# Patient Record
Sex: MALE | HISPANIC OR LATINO | Employment: FULL TIME | URBAN - METROPOLITAN AREA
[De-identification: names, ages, dates, MRNs, and addresses within clinical notes are randomized per-mention and may not be internally consistent; named-entity substitution may affect disease eponyms.]

---

## 2017-02-28 ENCOUNTER — HOSPITAL ENCOUNTER (OUTPATIENT)
Dept: LAB | Facility: MEDICAL CENTER | Age: 27
End: 2017-02-28
Attending: FAMILY MEDICINE
Payer: COMMERCIAL

## 2017-02-28 LAB
ALBUMIN SERPL BCP-MCNC: 4.5 G/DL (ref 3.2–4.9)
ALBUMIN/GLOB SERPL: 1.6 G/DL
ALP SERPL-CCNC: 70 U/L (ref 30–99)
ALT SERPL-CCNC: 32 U/L (ref 2–50)
ANION GAP SERPL CALC-SCNC: 9 MMOL/L (ref 0–11.9)
AST SERPL-CCNC: 23 U/L (ref 12–45)
BASOPHILS # BLD AUTO: 0.04 K/UL (ref 0–0.12)
BASOPHILS NFR BLD AUTO: 0.7 % (ref 0–1.8)
BILIRUB SERPL-MCNC: 0.6 MG/DL (ref 0.1–1.5)
BUN SERPL-MCNC: 17 MG/DL (ref 8–22)
CALCIUM SERPL-MCNC: 9.6 MG/DL (ref 8.5–10.5)
CHLORIDE SERPL-SCNC: 105 MMOL/L (ref 96–112)
CHOLEST SERPL-MCNC: 156 MG/DL (ref 100–199)
CO2 SERPL-SCNC: 27 MMOL/L (ref 20–33)
CREAT SERPL-MCNC: 1 MG/DL (ref 0.5–1.4)
EOSINOPHIL # BLD: 0.17 K/UL (ref 0–0.51)
EOSINOPHIL NFR BLD AUTO: 3 % (ref 0–6.9)
ERYTHROCYTE [DISTWIDTH] IN BLOOD BY AUTOMATED COUNT: 40.7 FL (ref 35.9–50)
GLOBULIN SER CALC-MCNC: 2.8 G/DL (ref 1.9–3.5)
GLUCOSE SERPL-MCNC: 89 MG/DL (ref 65–99)
HCT VFR BLD AUTO: 49.7 % (ref 42–52)
HCV AB S/CO SERPL IA: NEGATIVE
HDLC SERPL-MCNC: 50 MG/DL
HGB BLD-MCNC: 16.9 G/DL (ref 14–18)
IMM GRANULOCYTES # BLD AUTO: 0.01 K/UL (ref 0–0.11)
IMM GRANULOCYTES NFR BLD AUTO: 0.2 % (ref 0–0.9)
LDLC SERPL CALC-MCNC: 95 MG/DL
LYMPHOCYTES # BLD: 1.88 K/UL (ref 1–4.8)
LYMPHOCYTES NFR BLD AUTO: 33.6 % (ref 22–41)
MCH RBC QN AUTO: 30.8 PG (ref 27–33)
MCHC RBC AUTO-ENTMCNC: 34 G/DL (ref 33.7–35.3)
MCV RBC AUTO: 90.5 FL (ref 81.4–97.8)
MONOCYTES # BLD: 0.41 K/UL (ref 0–0.85)
MONOCYTES NFR BLD AUTO: 7.3 % (ref 0–13.4)
NEUTROPHILS # BLD: 3.08 K/UL (ref 1.82–7.42)
NEUTROPHILS NFR BLD AUTO: 55.2 % (ref 44–72)
NRBC # BLD AUTO: 0 K/UL
NRBC BLD-RTO: 0 /100 WBC
PLATELET # BLD AUTO: 208 K/UL (ref 164–446)
PMV BLD AUTO: 9.9 FL (ref 9–12.9)
POTASSIUM SERPL-SCNC: 4 MMOL/L (ref 3.6–5.5)
PROT SERPL-MCNC: 7.3 G/DL (ref 6–8.2)
RBC # BLD AUTO: 5.49 M/UL (ref 4.7–6.1)
SODIUM SERPL-SCNC: 141 MMOL/L (ref 135–145)
TRIGL SERPL-MCNC: 55 MG/DL (ref 0–149)
WBC # BLD AUTO: 5.6 K/UL (ref 4.8–10.8)

## 2017-02-28 PROCEDURE — 85025 COMPLETE CBC W/AUTO DIFF WBC: CPT

## 2017-02-28 PROCEDURE — 36415 COLL VENOUS BLD VENIPUNCTURE: CPT

## 2017-02-28 PROCEDURE — 86803 HEPATITIS C AB TEST: CPT

## 2017-02-28 PROCEDURE — 80061 LIPID PANEL: CPT

## 2017-02-28 PROCEDURE — 80053 COMPREHEN METABOLIC PANEL: CPT

## 2017-11-23 ENCOUNTER — OFFICE VISIT (OUTPATIENT)
Dept: URGENT CARE | Facility: PHYSICIAN GROUP | Age: 27
End: 2017-11-23
Payer: COMMERCIAL

## 2017-11-23 VITALS
HEIGHT: 69 IN | BODY MASS INDEX: 30.96 KG/M2 | HEART RATE: 86 BPM | RESPIRATION RATE: 16 BRPM | DIASTOLIC BLOOD PRESSURE: 78 MMHG | OXYGEN SATURATION: 96 % | TEMPERATURE: 98.7 F | WEIGHT: 209 LBS | SYSTOLIC BLOOD PRESSURE: 124 MMHG

## 2017-11-23 DIAGNOSIS — R05.9 COUGH: ICD-10-CM

## 2017-11-23 DIAGNOSIS — R09.81 SINUS CONGESTION: ICD-10-CM

## 2017-11-23 PROCEDURE — 99214 OFFICE O/P EST MOD 30 MIN: CPT | Performed by: PHYSICIAN ASSISTANT

## 2017-11-23 RX ORDER — AMOXICILLIN AND CLAVULANATE POTASSIUM 875; 125 MG/1; MG/1
1 TABLET, FILM COATED ORAL 2 TIMES DAILY
Qty: 20 TAB | Refills: 0 | Status: SHIPPED | OUTPATIENT
Start: 2017-11-23 | End: 2017-12-03

## 2017-11-23 RX ORDER — FLUTICASONE PROPIONATE 50 MCG
2 SPRAY, SUSPENSION (ML) NASAL DAILY
Qty: 16 G | Refills: 0 | Status: SHIPPED | OUTPATIENT
Start: 2017-11-23

## 2017-11-23 RX ORDER — CETIRIZINE HYDROCHLORIDE 10 MG/1
10 TABLET ORAL DAILY
Qty: 30 TAB | Refills: 0 | Status: SHIPPED | OUTPATIENT
Start: 2017-11-23

## 2017-11-23 ASSESSMENT — ENCOUNTER SYMPTOMS
VOMITING: 0
SORE THROAT: 1
DIARRHEA: 0
CHILLS: 0
HEADACHES: 1
FEVER: 1
MYALGIAS: 0
COUGH: 1
EYE REDNESS: 0
WHEEZING: 0
ABDOMINAL PAIN: 0
SHORTNESS OF BREATH: 0
EYE DISCHARGE: 0
NAUSEA: 0

## 2017-11-23 NOTE — PROGRESS NOTES
Subjective:      Saúl Ramsay is a 27 y.o. male who presents with Fever (of 101 last night, runny nose, pain & pressure behind eyes, cough x 1 month)            Fever    Associated symptoms include congestion, coughing, headaches and a sore throat. Pertinent negatives include no abdominal pain, chest pain, diarrhea, ear pain, nausea, rash, vomiting or wheezing.   notes last night w/ fever, took tylenol 12hr last night at 10pm, cough notes some dry cough, cough x last month, denies ear pain, c/o eye pressure and mild sorethroat, denies nausea/vomtint/abedpain/diARrhea/rash, denies pMH ofasthma/bronchitis, denies pMH of pneumonia, denies seasonal allerg, tried water for cough, tried tylenol cold / flu. Denies cough waking from sleep, some sinus congestion, some sinus congestion and runny nose as well. Denies flu shot.,     Review of Systems   Constitutional: Positive for fever. Negative for chills.   HENT: Positive for congestion and sore throat. Negative for ear pain.    Eyes: Negative for discharge and redness.   Respiratory: Positive for cough. Negative for shortness of breath and wheezing.    Cardiovascular: Negative for chest pain and leg swelling.   Gastrointestinal: Negative for abdominal pain, diarrhea, nausea and vomiting.   Musculoskeletal: Negative for myalgias.   Skin: Negative for rash.   Neurological: Positive for headaches.   Endo/Heme/Allergies: Negative for environmental allergies ( ?).     PMH:  has no past medical history on file.  MEDS:   Current Outpatient Prescriptions:   •  Ibuprofen (IBU PO), Take  by mouth., Disp: , Rfl:   •  ondansetron (ZOFRAN) 4 MG Tab tablet, Take 1 Tab by mouth every four hours as needed for Nausea/Vomiting., Disp: 20 Tab, Rfl: 1  •  fluticasone (FLONASE) 50 MCG/ACT nasal spray, Spray 1 Spray in nose every day., Disp: 1 Bottle, Rfl: 0  •  cetirizine (ZYRTEC) 10 MG TABS, Take 1 Tab by mouth every day., Disp: 30 Each, Rfl: 0  ALLERGIES: No Known Allergies  SURGHX: History  "reviewed. No pertinent surgical history.  SOCHX:  reports that he has never smoked. He has never used smokeless tobacco. He reports that he drinks alcohol.  FH: Family history was reviewed, no pertinent findings to report    .I have worn a mask for the entire encounter with this patient.        Objective:     /78   Pulse 86   Temp 37.1 °C (98.7 °F)   Resp 16   Ht 1.753 m (5' 9.02\")   Wt 94.8 kg (209 lb)   SpO2 96%   BMI 30.85 kg/m²      Physical Exam   Constitutional: He is oriented to person, place, and time. He appears well-developed and well-nourished. No distress.   HENT:   Head: Normocephalic and atraumatic.   Right Ear: External ear and ear canal normal. Tympanic membrane is bulging. Tympanic membrane is not erythematous.   Left Ear: External ear and ear canal normal. Tympanic membrane is bulging. Tympanic membrane is not erythematous.   Nose: Nose normal. Right sinus exhibits no maxillary sinus tenderness and no frontal sinus tenderness. Left sinus exhibits no maxillary sinus tenderness and no frontal sinus tenderness.   Mouth/Throat: Uvula is midline and mucous membranes are normal. Posterior oropharyngeal erythema ( PND) present. No oropharyngeal exudate, posterior oropharyngeal edema or tonsillar abscesses.   Eyes: Conjunctivae are normal. Right eye exhibits no discharge. Left eye exhibits no discharge. No scleral icterus.   Neck: Neck supple.   Pulmonary/Chest: Effort normal and breath sounds normal. No respiratory distress. He has no decreased breath sounds. He has no wheezes. He has no rhonchi. He has no rales.   Musculoskeletal: Normal range of motion.   Lymphadenopathy:     He has cervical adenopathy ( mild bilat).   Neurological: He is alert and oriented to person, place, and time. Coordination normal.   Skin: Skin is warm and dry. He is not diaphoretic. No pallor.   Psychiatric: He has a normal mood and affect.   Nursing note and vitals reviewed.              Assessment/Plan:     1. " Cough  Supportive care is reviewed with patient/caregiver - recommend to push PO fluids and electrolytes, Nsaids/tylenol, netti pot/saline irrig, humidifier in home, flonase, ponaris, antihistamines,  take full course of Rx, take with probiotics, observe for resolution  Return to clinic with lack of resolution or progression of symptoms.    2. Sinus congestion    - amoxicillin-clavulanate (AUGMENTIN) 875-125 MG Tab; Take 1 Tab by mouth 2 times a day for 10 days.  Dispense: 20 Tab; Refill: 0  - fluticasone (FLONASE) 50 MCG/ACT nasal spray; Spray 2 Sprays in nose every day.  Dispense: 16 g; Refill: 0  - cetirizine (ZYRTEC) 10 MG Tab; Take 1 Tab by mouth every day.  Dispense: 30 Tab; Refill: 0

## 2020-08-07 ENCOUNTER — HOSPITAL ENCOUNTER (OUTPATIENT)
Dept: LAB | Facility: MEDICAL CENTER | Age: 30
End: 2020-08-07
Attending: FAMILY MEDICINE
Payer: COMMERCIAL

## 2020-08-07 LAB
ALBUMIN SERPL BCP-MCNC: 4.9 G/DL (ref 3.2–4.9)
ALBUMIN/GLOB SERPL: 2.5 G/DL
ALP SERPL-CCNC: 86 U/L (ref 30–99)
ALT SERPL-CCNC: 49 U/L (ref 2–50)
ANION GAP SERPL CALC-SCNC: 12 MMOL/L (ref 7–16)
AST SERPL-CCNC: 25 U/L (ref 12–45)
BASOPHILS # BLD AUTO: 0.7 % (ref 0–1.8)
BASOPHILS # BLD: 0.05 K/UL (ref 0–0.12)
BILIRUB SERPL-MCNC: 0.4 MG/DL (ref 0.1–1.5)
BUN SERPL-MCNC: 16 MG/DL (ref 8–22)
CALCIUM SERPL-MCNC: 9.2 MG/DL (ref 8.5–10.5)
CHLORIDE SERPL-SCNC: 103 MMOL/L (ref 96–112)
CHOLEST SERPL-MCNC: 181 MG/DL (ref 100–199)
CO2 SERPL-SCNC: 23 MMOL/L (ref 20–33)
CREAT SERPL-MCNC: 0.89 MG/DL (ref 0.5–1.4)
EOSINOPHIL # BLD AUTO: 0.19 K/UL (ref 0–0.51)
EOSINOPHIL NFR BLD: 2.5 % (ref 0–6.9)
ERYTHROCYTE [DISTWIDTH] IN BLOOD BY AUTOMATED COUNT: 41.4 FL (ref 35.9–50)
FASTING STATUS PATIENT QL REPORTED: NORMAL
GLOBULIN SER CALC-MCNC: 2 G/DL (ref 1.9–3.5)
GLUCOSE SERPL-MCNC: 112 MG/DL (ref 65–99)
HCT VFR BLD AUTO: 48.5 % (ref 42–52)
HDLC SERPL-MCNC: 41 MG/DL
HGB BLD-MCNC: 16.6 G/DL (ref 14–18)
IMM GRANULOCYTES # BLD AUTO: 0.03 K/UL (ref 0–0.11)
IMM GRANULOCYTES NFR BLD AUTO: 0.4 % (ref 0–0.9)
LDLC SERPL CALC-MCNC: 108 MG/DL
LYMPHOCYTES # BLD AUTO: 2.8 K/UL (ref 1–4.8)
LYMPHOCYTES NFR BLD: 37 % (ref 22–41)
MCH RBC QN AUTO: 31.1 PG (ref 27–33)
MCHC RBC AUTO-ENTMCNC: 34.2 G/DL (ref 33.7–35.3)
MCV RBC AUTO: 90.8 FL (ref 81.4–97.8)
MONOCYTES # BLD AUTO: 0.55 K/UL (ref 0–0.85)
MONOCYTES NFR BLD AUTO: 7.3 % (ref 0–13.4)
NEUTROPHILS # BLD AUTO: 3.95 K/UL (ref 1.82–7.42)
NEUTROPHILS NFR BLD: 52.1 % (ref 44–72)
NRBC # BLD AUTO: 0 K/UL
NRBC BLD-RTO: 0 /100 WBC
PLATELET # BLD AUTO: 187 K/UL (ref 164–446)
PMV BLD AUTO: 9.8 FL (ref 9–12.9)
POTASSIUM SERPL-SCNC: 3.8 MMOL/L (ref 3.6–5.5)
PROT SERPL-MCNC: 6.9 G/DL (ref 6–8.2)
RBC # BLD AUTO: 5.34 M/UL (ref 4.7–6.1)
SODIUM SERPL-SCNC: 138 MMOL/L (ref 135–145)
T3FREE SERPL-MCNC: 4.63 PG/ML (ref 2–4.4)
T4 FREE SERPL-MCNC: 1.25 NG/DL (ref 0.93–1.7)
TRIGL SERPL-MCNC: 160 MG/DL (ref 0–149)
TSH SERPL DL<=0.005 MIU/L-ACNC: 2.94 UIU/ML (ref 0.38–5.33)
WBC # BLD AUTO: 7.6 K/UL (ref 4.8–10.8)

## 2020-08-07 PROCEDURE — 36415 COLL VENOUS BLD VENIPUNCTURE: CPT

## 2020-08-07 PROCEDURE — 84443 ASSAY THYROID STIM HORMONE: CPT

## 2020-08-07 PROCEDURE — 84439 ASSAY OF FREE THYROXINE: CPT

## 2020-08-07 PROCEDURE — 80061 LIPID PANEL: CPT

## 2020-08-07 PROCEDURE — 80053 COMPREHEN METABOLIC PANEL: CPT

## 2020-08-07 PROCEDURE — 84481 FREE ASSAY (FT-3): CPT

## 2020-08-07 PROCEDURE — 85025 COMPLETE CBC W/AUTO DIFF WBC: CPT

## 2021-07-10 ENCOUNTER — HOSPITAL ENCOUNTER (EMERGENCY)
Facility: MEDICAL CENTER | Age: 31
End: 2021-07-10
Attending: EMERGENCY MEDICINE
Payer: COMMERCIAL

## 2021-07-10 ENCOUNTER — APPOINTMENT (OUTPATIENT)
Dept: RADIOLOGY | Facility: MEDICAL CENTER | Age: 31
End: 2021-07-10
Attending: EMERGENCY MEDICINE
Payer: COMMERCIAL

## 2021-07-10 VITALS
SYSTOLIC BLOOD PRESSURE: 106 MMHG | BODY MASS INDEX: 32.58 KG/M2 | WEIGHT: 220 LBS | OXYGEN SATURATION: 94 % | HEART RATE: 81 BPM | DIASTOLIC BLOOD PRESSURE: 65 MMHG | TEMPERATURE: 97.4 F | RESPIRATION RATE: 18 BRPM | HEIGHT: 69 IN

## 2021-07-10 DIAGNOSIS — J12.82 PNEUMONIA DUE TO COVID-19 VIRUS: ICD-10-CM

## 2021-07-10 DIAGNOSIS — U07.1 PNEUMONIA DUE TO COVID-19 VIRUS: ICD-10-CM

## 2021-07-10 LAB
ALBUMIN SERPL BCP-MCNC: 4.2 G/DL (ref 3.2–4.9)
ALBUMIN/GLOB SERPL: 1.4 G/DL
ALP SERPL-CCNC: 119 U/L (ref 30–99)
ALT SERPL-CCNC: 353 U/L (ref 2–50)
ANION GAP SERPL CALC-SCNC: 13 MMOL/L (ref 7–16)
AST SERPL-CCNC: 225 U/L (ref 12–45)
BASOPHILS # BLD AUTO: 0 % (ref 0–1.8)
BASOPHILS # BLD: 0 K/UL (ref 0–0.12)
BILIRUB SERPL-MCNC: 0.4 MG/DL (ref 0.1–1.5)
BUN SERPL-MCNC: 10 MG/DL (ref 8–22)
CALCIUM SERPL-MCNC: 8.4 MG/DL (ref 8.5–10.5)
CHLORIDE SERPL-SCNC: 103 MMOL/L (ref 96–112)
CO2 SERPL-SCNC: 22 MMOL/L (ref 20–33)
CREAT SERPL-MCNC: 0.96 MG/DL (ref 0.5–1.4)
CRP SERPL HS-MCNC: 52.3 MG/L (ref 0–7.5)
D DIMER PPP IA.FEU-MCNC: 0.33 UG/ML (FEU) (ref 0–0.5)
EOSINOPHIL # BLD AUTO: 0.01 K/UL (ref 0–0.51)
EOSINOPHIL NFR BLD: 0.2 % (ref 0–6.9)
ERYTHROCYTE [DISTWIDTH] IN BLOOD BY AUTOMATED COUNT: 41.9 FL (ref 35.9–50)
GLOBULIN SER CALC-MCNC: 2.9 G/DL (ref 1.9–3.5)
GLUCOSE SERPL-MCNC: 99 MG/DL (ref 65–99)
HCT VFR BLD AUTO: 47.4 % (ref 42–52)
HGB BLD-MCNC: 16.1 G/DL (ref 14–18)
IMM GRANULOCYTES # BLD AUTO: 0.02 K/UL (ref 0–0.11)
IMM GRANULOCYTES NFR BLD AUTO: 0.4 % (ref 0–0.9)
LACTATE BLD-SCNC: 1.4 MMOL/L (ref 0.5–2)
LACTATE BLD-SCNC: 1.5 MMOL/L (ref 0.5–2)
LYMPHOCYTES # BLD AUTO: 1.14 K/UL (ref 1–4.8)
LYMPHOCYTES NFR BLD: 23.8 % (ref 22–41)
MCH RBC QN AUTO: 30.3 PG (ref 27–33)
MCHC RBC AUTO-ENTMCNC: 34 G/DL (ref 33.7–35.3)
MCV RBC AUTO: 89.1 FL (ref 81.4–97.8)
MONOCYTES # BLD AUTO: 0.25 K/UL (ref 0–0.85)
MONOCYTES NFR BLD AUTO: 5.2 % (ref 0–13.4)
NEUTROPHILS # BLD AUTO: 3.36 K/UL (ref 1.82–7.42)
NEUTROPHILS NFR BLD: 70.4 % (ref 44–72)
NRBC # BLD AUTO: 0 K/UL
NRBC BLD-RTO: 0 /100 WBC
PLATELET # BLD AUTO: 155 K/UL (ref 164–446)
PMV BLD AUTO: 9.4 FL (ref 9–12.9)
POTASSIUM SERPL-SCNC: 4.3 MMOL/L (ref 3.6–5.5)
PROCALCITONIN SERPL-MCNC: <0.05 NG/ML
PROT SERPL-MCNC: 7.1 G/DL (ref 6–8.2)
RBC # BLD AUTO: 5.32 M/UL (ref 4.7–6.1)
SODIUM SERPL-SCNC: 138 MMOL/L (ref 135–145)
TROPONIN T SERPL-MCNC: <6 NG/L (ref 6–19)
WBC # BLD AUTO: 4.8 K/UL (ref 4.8–10.8)

## 2021-07-10 PROCEDURE — 83605 ASSAY OF LACTIC ACID: CPT | Mod: 91

## 2021-07-10 PROCEDURE — 700117 HCHG RX CONTRAST REV CODE 255: Performed by: EMERGENCY MEDICINE

## 2021-07-10 PROCEDURE — A9270 NON-COVERED ITEM OR SERVICE: HCPCS | Performed by: EMERGENCY MEDICINE

## 2021-07-10 PROCEDURE — 87040 BLOOD CULTURE FOR BACTERIA: CPT

## 2021-07-10 PROCEDURE — 85025 COMPLETE CBC W/AUTO DIFF WBC: CPT

## 2021-07-10 PROCEDURE — 86141 C-REACTIVE PROTEIN HS: CPT

## 2021-07-10 PROCEDURE — 71275 CT ANGIOGRAPHY CHEST: CPT

## 2021-07-10 PROCEDURE — 36415 COLL VENOUS BLD VENIPUNCTURE: CPT

## 2021-07-10 PROCEDURE — 99284 EMERGENCY DEPT VISIT MOD MDM: CPT

## 2021-07-10 PROCEDURE — 71045 X-RAY EXAM CHEST 1 VIEW: CPT

## 2021-07-10 PROCEDURE — 85379 FIBRIN DEGRADATION QUANT: CPT

## 2021-07-10 PROCEDURE — 700102 HCHG RX REV CODE 250 W/ 637 OVERRIDE(OP): Performed by: EMERGENCY MEDICINE

## 2021-07-10 PROCEDURE — 80053 COMPREHEN METABOLIC PANEL: CPT

## 2021-07-10 PROCEDURE — 84484 ASSAY OF TROPONIN QUANT: CPT

## 2021-07-10 PROCEDURE — 84145 PROCALCITONIN (PCT): CPT

## 2021-07-10 RX ORDER — DOXYCYCLINE 100 MG/1
100 CAPSULE ORAL 2 TIMES DAILY
Qty: 10 CAPSULE | Refills: 0 | Status: SHIPPED | OUTPATIENT
Start: 2021-07-10 | End: 2021-07-15

## 2021-07-10 RX ORDER — DOXYCYCLINE 100 MG/1
100 TABLET ORAL ONCE
Status: COMPLETED | OUTPATIENT
Start: 2021-07-10 | End: 2021-07-10

## 2021-07-10 RX ORDER — ACETAMINOPHEN 325 MG/1
650 TABLET ORAL EVERY 4 HOURS PRN
Status: DISCONTINUED | OUTPATIENT
Start: 2021-07-10 | End: 2021-07-10 | Stop reason: HOSPADM

## 2021-07-10 RX ADMIN — DOXYCYCLINE 100 MG: 100 TABLET, FILM COATED ORAL at 20:47

## 2021-07-10 RX ADMIN — IOHEXOL 56 ML: 350 INJECTION, SOLUTION INTRAVENOUS at 19:27

## 2021-07-10 RX ADMIN — ACETAMINOPHEN 650 MG: 325 TABLET, FILM COATED ORAL at 17:39

## 2021-07-10 ASSESSMENT — FIBROSIS 4 INDEX: FIB4 SCORE: 0.57

## 2021-07-10 NOTE — ED TRIAGE NOTES
".  Chief Complaint   Patient presents with   • Cough     Onset X approximately 4-5 days   • Tired     Onset X approximately 4-5 days   • Fever     Onset 6/29/21   • Loss of Appetite     X 3 days.    • Low Back Pain   Pt reports testing COVID positive on 6/29/21.  No difficulty breathing.  Pt states \" I try to eat, just not as hungry. \"  Low back pain onset yesterday, \" super sharp pain. \"  Pt's wife and daughter also COVID positive, \" both are feeling better. \"    "

## 2021-07-11 NOTE — ED NOTES
Discharge teaching with paperwork regarding cough and fever provided to pt. Pt verbalized understanding of teaching and all questions answered. Pt is A&Ox4 with stable vit signs, stable respiratory assessment, and PIV removed upon discharge. Pt ambulatory out of ED with steady gait.

## 2021-07-11 NOTE — ED PROVIDER NOTES
ED Provider Note    CHIEF COMPLAINT  Chief Complaint   Patient presents with   • Cough     Onset X approximately 4-5 days   • Tired     Onset X approximately 4-5 days   • Fever     Onset 6/29/21   • Loss of Appetite     X 3 days.    • Low Back Pain       HPI  Saúl Ramsay is a 31 y.o. male who presents to the Emergency Department with a recent diagnosis of Covid.  The patient states he had a positive cast 11 days ago on June 29.  He states that he has had a cough for about 4 to 5 days and fatigue for 4 to 5 days.  He started noticing fevers on the 29th and for the last 3 days he has been anorexic, he is trying to eat but his appetite is poor.  He is having back pain at the bottom of his ribs, it is sharp and worse with coughing.  He is mostly concerned about his lungs because the cough has not stopped and he has had persistent fevers for 11 days.  Temperature on arrival is 102.7.  His wife and daughter are both feeling better and were also Covid positive.    REVIEW OF SYSTEMS  As above, all other systems negative.  PAST MEDICAL HISTORY       SOCIAL HISTORY  Social History     Tobacco Use   • Smoking status: Never Smoker   • Smokeless tobacco: Never Used   Substance and Sexual Activity   • Alcohol use: Not Currently   • Drug use: Not on file   • Sexual activity: Not on file       SURGICAL HISTORY  patient denies any surgical history    CURRENT MEDICATIONS  Reviewed.  See Encounter Summary.  Include   Home Medications    Medication Sig Taking? Last Dose Authorizing Provider   doxycycline (MONODOX) 100 MG capsule Take 1 capsule by mouth 2 times a day for 5 days. Yes  Celine Null M.D.   fluticasone (FLONASE) 50 MCG/ACT nasal spray Spray 2 Sprays in nose every day.   Kd Bradley P.A.-C.   cetirizine (ZYRTEC) 10 MG Tab Take 1 Tab by mouth every day.   SCOOBY Tripp.A.-C.   Ibuprofen (IBU PO) Take  by mouth.   Physician Outpatient   ondansetron (ZOFRAN) 4 MG Tab tablet Take 1 Tab by mouth every four  "hours as needed for Nausea/Vomiting.   Dianelys Mora P.A.-C.   fluticasone (FLONASE) 50 MCG/ACT nasal spray Spray 1 Spray in nose every day.   Arvind Cheney M.D.   cetirizine (ZYRTEC) 10 MG TABS Take 1 Tab by mouth every day.   Arvind Cheney M.D.       ALLERGIES  No Known Allergies    PHYSICAL EXAM  VITAL SIGNS: /65   Pulse 81   Temp 36.3 °C (97.4 °F) (Temporal)   Resp 18   Ht 1.753 m (5' 9\")   Wt 99.8 kg (220 lb)   SpO2 94%   BMI 32.49 kg/m²   Constitutional: Speaks in full sentences alert in no apparent distress.  HENT: Normocephalic, Bilateral external ears normal. Nose normal.   Eyes: Pupils are equal and reactive. Conjunctiva normal, non-icteric.   Thorax & Lungs: Easy unlabored respirations  Abdomen:  No gross signs of peritonitis, no pain with movement   Skin: Visualized skin is  Dry, No erythema, No rash.   Extremities:   No edema, No asymmetry  Neurologic: Alert, Grossly non-focal.   Psychiatric: Affect and Mood normal      COURSE & MEDICAL DECISION MAKING  Nursing notes and vital signs were reviewed. (See chart for details)    The patient presents to the Emergency Department with persistent fevers for 11 days with positive Covid testing, he is feeling chest pain now which is sharp and worse with inspiration.  He is mostly worried about his lungs and his coughing..  On arrival here he is febrile sepsis work-up was initiated.    Results for orders placed or performed during the hospital encounter of 07/10/21   Lactic acid (lactate)   Result Value Ref Range    Lactic Acid 1.4 0.5 - 2.0 mmol/L   Lactic acid (lactate): Repeat if initial lactic acid result is greater than 2   Result Value Ref Range    Lactic Acid 1.5 0.5 - 2.0 mmol/L   CBC WITH DIFFERENTIAL   Result Value Ref Range    WBC 4.8 4.8 - 10.8 K/uL    RBC 5.32 4.70 - 6.10 M/uL    Hemoglobin 16.1 14.0 - 18.0 g/dL    Hematocrit 47.4 42.0 - 52.0 %    MCV 89.1 81.4 - 97.8 fL    MCH 30.3 27.0 - 33.0 pg    MCHC 34.0 33.7 - 35.3 g/dL "    RDW 41.9 35.9 - 50.0 fL    Platelet Count 155 (L) 164 - 446 K/uL    MPV 9.4 9.0 - 12.9 fL    Neutrophils-Polys 70.40 44.00 - 72.00 %    Lymphocytes 23.80 22.00 - 41.00 %    Monocytes 5.20 0.00 - 13.40 %    Eosinophils 0.20 0.00 - 6.90 %    Basophils 0.00 0.00 - 1.80 %    Immature Granulocytes 0.40 0.00 - 0.90 %    Nucleated RBC 0.00 /100 WBC    Neutrophils (Absolute) 3.36 1.82 - 7.42 K/uL    Lymphs (Absolute) 1.14 1.00 - 4.80 K/uL    Monos (Absolute) 0.25 0.00 - 0.85 K/uL    Eos (Absolute) 0.01 0.00 - 0.51 K/uL    Baso (Absolute) 0.00 0.00 - 0.12 K/uL    Immature Granulocytes (abs) 0.02 0.00 - 0.11 K/uL    NRBC (Absolute) 0.00 K/uL   COMP METABOLIC PANEL   Result Value Ref Range    Sodium 138 135 - 145 mmol/L    Potassium 4.3 3.6 - 5.5 mmol/L    Chloride 103 96 - 112 mmol/L    Co2 22 20 - 33 mmol/L    Anion Gap 13.0 7.0 - 16.0    Glucose 99 65 - 99 mg/dL    Bun 10 8 - 22 mg/dL    Creatinine 0.96 0.50 - 1.40 mg/dL    Calcium 8.4 (L) 8.5 - 10.5 mg/dL    AST(SGOT) 225 (H) 12 - 45 U/L    ALT(SGPT) 353 (H) 2 - 50 U/L    Alkaline Phosphatase 119 (H) 30 - 99 U/L    Total Bilirubin 0.4 0.1 - 1.5 mg/dL    Albumin 4.2 3.2 - 4.9 g/dL    Total Protein 7.1 6.0 - 8.2 g/dL    Globulin 2.9 1.9 - 3.5 g/dL    A-G Ratio 1.4 g/dL   ESTIMATED GFR   Result Value Ref Range    GFR If African American >60 >60 mL/min/1.73 m 2    GFR If Non African American >60 >60 mL/min/1.73 m 2   D-DIMER   Result Value Ref Range    D-Dimer Screen 0.33 0.00 - 0.50 ug/mL (FEU)   CRP HIGH SENSITIVE (CARDIAC)   Result Value Ref Range    C Reactive Protein High Sensitive 52.3 (H) 0.0 - 7.5 mg/L   PROCALCITONIN   Result Value Ref Range    Procalcitonin <0.05 <0.25 ng/mL   TROPONIN   Result Value Ref Range    Troponin T <6 6 - 19 ng/L   Secondary to the patient's pleuritic chest pain in the setting of Covid and persistent fever CT chest was ordered to rule out either pulmonary emboli or pneumonia.    Labs are remarkable for elevated AST and ALT.  His white  blood cell count and absolute leukocyte count are in normal range.  Lactic acid is normal at 1.4    I am concerned that he has persistent fevers, he has predominantly respiratory symptomatology but greater than 10 days of fever.  He is not hypoxic with saturations here of 93 to 95% on room air and does not meet criteria for steroids or admission at this time.  I have sent a blood culture and I am going to start him on doxycycline for community-acquired pneumonia bacterial prophylaxis.  We have talked about nutritional supplementation specifically zinc, vitamin D, and vitamin C.  He will use Tylenol for his fever.  The patient otherwise exhibits no evidence of respiratory distress, pulmonary emboli, acute morning myocarditis or other emergent symptoms.  I do not like that his symptoms are persisted for now 11 days with fever and he is febrile here although otherwise well-appearing.  He is comfortable going home and he agrees to have a mandatory recheck by his primary care on Monday to make sure he is not becoming hypoxic and has agreed to return to the ER if he is worsening in any way         The patient was discharged home with an information sheet on covid pneumonia. and told to return immediately for any signs or symptoms listed, or any unexpected symptoms.  The patient verbally agreed to the discharge precautions and follow-up plan which is documented in EPIC.  DISPOSITION:    Patient will be discharged home in stable condition.    FOLLOW UP:  Nigel Dickson M.D.  [address]  [facility] [address]  [facility] [id number] [phone]      recheck oxygen level in 2 days, return to ED if any worsening at all      OUTPATIENT MEDICATIONS:  New Prescriptions    DOXYCYCLINE (MONODOX) 100 MG CAPSULE    Take 1 capsule by mouth 2 times a day for 5 days.         FINAL IMPRESSION   1. Pneumonia due to COVID-19 virus

## 2021-07-15 LAB
BACTERIA BLD CULT: NORMAL
BACTERIA BLD CULT: NORMAL
SIGNIFICANT IND 70042: NORMAL
SIGNIFICANT IND 70042: NORMAL
SITE SITE: NORMAL
SITE SITE: NORMAL
SOURCE SOURCE: NORMAL
SOURCE SOURCE: NORMAL

## 2022-09-11 ENCOUNTER — OFFICE VISIT (OUTPATIENT)
Dept: URGENT CARE | Facility: PHYSICIAN GROUP | Age: 32
End: 2022-09-11
Payer: COMMERCIAL

## 2022-09-11 VITALS
WEIGHT: 210 LBS | HEART RATE: 64 BPM | OXYGEN SATURATION: 96 % | BODY MASS INDEX: 31.1 KG/M2 | SYSTOLIC BLOOD PRESSURE: 144 MMHG | RESPIRATION RATE: 12 BRPM | HEIGHT: 69 IN | TEMPERATURE: 97.8 F | DIASTOLIC BLOOD PRESSURE: 76 MMHG

## 2022-09-11 DIAGNOSIS — Z20.818 STREP THROAT EXPOSURE: ICD-10-CM

## 2022-09-11 LAB
INT CON NEG: NORMAL
INT CON POS: NORMAL
S PYO AG THROAT QL: NEGATIVE

## 2022-09-11 PROCEDURE — 99202 OFFICE O/P NEW SF 15 MIN: CPT | Performed by: PHYSICIAN ASSISTANT

## 2022-09-11 PROCEDURE — 87880 STREP A ASSAY W/OPTIC: CPT | Performed by: PHYSICIAN ASSISTANT

## 2022-09-11 ASSESSMENT — ENCOUNTER SYMPTOMS
VOMITING: 0
NAUSEA: 0
WHEEZING: 0
FEVER: 0
MYALGIAS: 0
SORE THROAT: 0
COUGH: 0
SHORTNESS OF BREATH: 0
CHILLS: 0

## 2022-09-11 ASSESSMENT — FIBROSIS 4 INDEX: FIB4 SCORE: 2.4

## 2022-09-11 NOTE — PROGRESS NOTES
"Subjective     Saúl Ramsay is a 32 y.o. male who presents with Pharyngitis (Needs to be tested for strep )            The patient is here today requesting a strep test. His pregnant wife and daughter tested positive for strep last night. His wife is being induced next week so he is here to test out of precaution. He denies any active symptoms. No fever, chills, cough, nausea, vomiting or throat pain.      History reviewed. No pertinent past medical history.    History reviewed. No pertinent surgical history.    Family History   Problem Relation Age of Onset    Diabetes Mother     Diabetes Father     Diabetes Maternal Aunt     Diabetes Maternal Uncle     Diabetes Maternal Grandmother     Diabetes Maternal Grandfather     Diabetes Paternal Grandmother     Diabetes Paternal Grandfather     Diabetes Maternal Aunt        No Known Allergies      Medications, Allergies, and current problem list reviewed today in Epic      Review of Systems   Constitutional:  Negative for chills, fever and malaise/fatigue.   HENT:  Negative for congestion and sore throat.    Respiratory:  Negative for cough, shortness of breath and wheezing.    Gastrointestinal:  Negative for nausea and vomiting.   Musculoskeletal:  Negative for myalgias.        All other systems reviewed and are negative.       Objective     BP (!) 144/76   Pulse 64   Temp 36.6 °C (97.8 °F) (Temporal)   Resp 12   Ht 1.753 m (5' 9\")   Wt 95.3 kg (210 lb)   SpO2 96%   BMI 31.01 kg/m²      Physical Exam  Constitutional:       General: He is not in acute distress.     Appearance: He is not ill-appearing.   HENT:      Head: Normocephalic and atraumatic.      Mouth/Throat:      Mouth: Mucous membranes are moist.      Pharynx: No oropharyngeal exudate or posterior oropharyngeal erythema.   Eyes:      Conjunctiva/sclera: Conjunctivae normal.   Cardiovascular:      Rate and Rhythm: Normal rate and regular rhythm.   Pulmonary:      Effort: Pulmonary effort is normal. No " respiratory distress.      Breath sounds: No stridor. No wheezing.   Lymphadenopathy:      Cervical: No cervical adenopathy.   Skin:     General: Skin is warm.   Neurological:      General: No focal deficit present.      Mental Status: He is alert and oriented to person, place, and time.   Psychiatric:         Mood and Affect: Mood normal.         Behavior: Behavior normal.         Thought Content: Thought content normal.         Judgment: Judgment normal.                           Assessment & Plan        1. Strep throat exposure  POCT Rapid Strep A        Poct strep- negative        Reassurance given.    Differential diagnoses, Supportive care, and indications for immediate follow-up discussed with patient.   Pathogenesis of diagnosis discussed including typical length and natural progression.   Instructed to return to clinic or nearest emergency department for any change in condition, further concerns, or worsening of symptoms.      The patient demonstrated a good understanding and agreed with the treatment plan.    Sarahy Cho P.A.-C.

## 2023-11-03 ENCOUNTER — OFFICE VISIT (OUTPATIENT)
Dept: URGENT CARE | Facility: PHYSICIAN GROUP | Age: 33
End: 2023-11-03
Payer: COMMERCIAL

## 2023-11-03 VITALS
HEART RATE: 88 BPM | WEIGHT: 216 LBS | RESPIRATION RATE: 14 BRPM | DIASTOLIC BLOOD PRESSURE: 62 MMHG | SYSTOLIC BLOOD PRESSURE: 110 MMHG | BODY MASS INDEX: 31.99 KG/M2 | OXYGEN SATURATION: 98 % | TEMPERATURE: 98 F | HEIGHT: 69 IN

## 2023-11-03 DIAGNOSIS — U07.1 COVID-19: ICD-10-CM

## 2023-11-03 DIAGNOSIS — J02.9 ACUTE PHARYNGITIS, UNSPECIFIED ETIOLOGY: ICD-10-CM

## 2023-11-03 LAB
FLUAV RNA SPEC QL NAA+PROBE: NEGATIVE
FLUBV RNA SPEC QL NAA+PROBE: NEGATIVE
RSV RNA SPEC QL NAA+PROBE: NEGATIVE
S PYO DNA SPEC NAA+PROBE: NOT DETECTED
SARS-COV-2 RNA RESP QL NAA+PROBE: POSITIVE

## 2023-11-03 PROCEDURE — 99213 OFFICE O/P EST LOW 20 MIN: CPT

## 2023-11-03 PROCEDURE — 3074F SYST BP LT 130 MM HG: CPT

## 2023-11-03 PROCEDURE — 0241U POCT CEPHEID COV-2, FLU A/B, RSV - PCR: CPT

## 2023-11-03 PROCEDURE — 3078F DIAST BP <80 MM HG: CPT

## 2023-11-03 PROCEDURE — 87651 STREP A DNA AMP PROBE: CPT

## 2023-11-03 RX ORDER — DEXAMETHASONE SODIUM PHOSPHATE 10 MG/ML
10 INJECTION INTRAMUSCULAR; INTRAVENOUS ONCE
Status: COMPLETED | OUTPATIENT
Start: 2023-11-03 | End: 2023-11-03

## 2023-11-03 RX ADMIN — DEXAMETHASONE SODIUM PHOSPHATE 10 MG: 10 INJECTION INTRAMUSCULAR; INTRAVENOUS at 17:42

## 2023-11-03 ASSESSMENT — ENCOUNTER SYMPTOMS
SORE THROAT: 1
NAUSEA: 0
WHEEZING: 0
ABDOMINAL PAIN: 0
HEADACHES: 1
DIARRHEA: 0
CHILLS: 1
SPUTUM PRODUCTION: 0
WEIGHT LOSS: 0
SHORTNESS OF BREATH: 0
DIAPHORESIS: 0
COUGH: 1
VOMITING: 0
STRIDOR: 0
HEMOPTYSIS: 0
FEVER: 1
SINUS PAIN: 0

## 2023-11-04 NOTE — PATIENT INSTRUCTIONS
"As we discussed your clinical condition would benefit from over-the-counter remedies:    FLONASE (once per day)  You may consider intranasal steroids such as fluticasone (brand name Flonase), (other options include Nasonex, Rhinocort, Nasacort) daily; continue for at least 2-3 weeks. Any generic should work as well but may cause slightly more nasal irritation. Please take according to package directions.  This steroid will help reduce inflammation of your sinuses.  This is the number one medication to help with seasonal allergies and treat nasal inflammation.  Cost: around $8 at [facility] for the generic fluticasone [facility] product (as of 5/20).    ANTIHISTAMINES  You may take a non-sedating antihistamine like Zyrtec (cetirizine) , Allegra (fexofenadine), Xyzal (levocetirizine), or Claritin (loratadine).  This will help \"dry you out\" and reduce the amount of nasal inflammation.  Follow package directions paying attention to whether they are once or twice daily.  Note: if there is a \"D\" such dahlia Allegra-D it also contains a decongestant such as sudafed.  Some patients benefit from alternating these medications every few weeks but literature does not support this.   Cost: around $11 at [employer] for the generic cetirizine [employer] branded product (as of 5/20)    SUDAFED (low dose to see if tolerated)  You may consider over-the-counter Sudafed (pseudoephedrine) to act as a decongestant to improve your breathing through your nose.  Please do not use this medication if you already have known high blood pressure.  Please take according to package directions and note that this has a stimulating effect and should not be taken late in the day.  There is a low dose 12 hour formulation and a higher dose 24 hour formulation.  Start with a low dose to make sure you tolerate the medication.  Do not take late in the day as it may interfere with sleep.   Cost: Around $6 for the generic [facility] branded product at a 10mg dose (as of " "2/2023).      BENZOCAINE DROPS  These contain the active ingredient benzocaine which is an anesthetic agent.  It helps relieve the symptoms of sore throat and may diminish your cough reflex.  Take according to package directions.  The brand name CEPACOL but the generic will suffice.  Please note that taking too frequently may cause harm - pay attention to package directions.  Cost: around $4 at [employer] for Chloroseptic drops (15 count) (as of 2/2023).      SALINE IRRIGATION/SPRAY  You may consider using a saline nasal irrigation (such as a \"Neti pot\" or similar device using sterile water, NOT tap water) or OTC saline nasal spray      NSAIDs  You may take Ibuprofen (brand name Motrin or Advil) may be taken 800 mg up to three times per day taken with food (do not exceed 2400 mg per day).  If you prefer you may consider Naproxen (brand name Naprosyn or Aleve) around 500 mg twice per day with food (do not exceed 1200 mg per day). Please do not take if you have known stomach ulcers or known kidney disease.     TYLENOL  You may take Tylenol according to package directions (1000 mg every 8 hours not to exceed 3000 mg per day).  Please do not consume with any alcohol products, or if you have known or suspected liver disease. These will help reduce inflammation, help with pain control, and can reduce fevers.     "

## 2023-11-04 NOTE — PROGRESS NOTES
Subjective:   Saúl Ramasy is a very pleasant 33 y.o. male who presents for:    Chief Complaint   Patient presents with    Fever     Hurts to swallow, ears hurt when swallowing, headache, mucus   X 1 day        HPI:    Fever   This is a new problem. The current episode started yesterday. The maximum temperature noted was 100 to 100.9 F. The temperature was taken using an oral thermometer. Associated symptoms include congestion, coughing, ear pain, headaches, muscle aches and a sore throat. Pertinent negatives include no abdominal pain, chest pain, diarrhea, nausea, rash, sleepiness, urinary pain, vomiting or wheezing. He has tried acetaminophen for the symptoms. The treatment provided mild relief.       ROS:    Review of Systems   Constitutional:  Positive for chills and fever. Negative for diaphoresis, malaise/fatigue and weight loss.   HENT:  Positive for congestion, ear pain and sore throat. Negative for ear discharge and sinus pain.    Respiratory:  Positive for cough. Negative for hemoptysis, sputum production, shortness of breath, wheezing and stridor.    Cardiovascular:  Negative for chest pain.   Gastrointestinal:  Negative for abdominal pain, diarrhea, nausea and vomiting.   Genitourinary:  Negative for dysuria.   Skin:  Negative for rash.   Neurological:  Positive for headaches.   All other systems reviewed and are negative.      Medications:      Current Outpatient Medications   Medication Sig    fluticasone (FLONASE) 50 MCG/ACT nasal spray Spray 2 Sprays in nose every day. (Patient not taking: Reported on 9/11/2022)    cetirizine (ZYRTEC) 10 MG Tab Take 1 Tab by mouth every day. (Patient not taking: Reported on 9/11/2022)    Ibuprofen (IBU PO) Take  by mouth. (Patient not taking: Reported on 9/11/2022)    ondansetron (ZOFRAN) 4 MG Tab tablet Take 1 Tab by mouth every four hours as needed for Nausea/Vomiting. (Patient not taking: Reported on 9/11/2022)    fluticasone (FLONASE) 50 MCG/ACT nasal spray  Spray 1 Spray in nose every day. (Patient not taking: Reported on 9/11/2022)    cetirizine (ZYRTEC) 10 MG TABS Take 1 Tab by mouth every day. (Patient not taking: Reported on 9/11/2022)       Allergies:     No Known Allergies    Problem List:     Patient Active Problem List   Diagnosis   (none) - all problems resolved or deleted       Surgical History:    No past surgical history on file.    Past Social Hx:     Social History     Socioeconomic History    Marital status:    Tobacco Use    Smoking status: Never    Smokeless tobacco: Never   Vaping Use    Vaping Use: Never used   Substance and Sexual Activity    Alcohol use: Not Currently    Drug use: Not Currently        Past Family Hx:      Family History   Problem Relation Age of Onset    Diabetes Mother     Diabetes Father     Diabetes Maternal Aunt     Diabetes Maternal Uncle     Diabetes Maternal Grandmother     Diabetes Maternal Grandfather     Diabetes Paternal Grandmother     Diabetes Paternal Grandfather     Diabetes Maternal Aunt        Problem list, medications, and allergies reviewed by myself today in Epic.     Objective:     Vitals:    11/03/23 1713   BP: 110/62   Pulse: 88   Resp: 14   Temp: 36.7 °C (98 °F)   SpO2: 98%       Physical Exam  Vitals reviewed.   Constitutional:       General: He is not in acute distress.     Appearance: Normal appearance. He is not ill-appearing, toxic-appearing or diaphoretic.   HENT:      Head: Normocephalic and atraumatic.      Right Ear: Tympanic membrane, ear canal and external ear normal.      Left Ear: Tympanic membrane, ear canal and external ear normal.      Nose: Congestion and rhinorrhea present.      Mouth/Throat:      Mouth: Mucous membranes are moist.      Pharynx: Oropharynx is clear. Posterior oropharyngeal erythema present. No oropharyngeal exudate.   Eyes:      Extraocular Movements: Extraocular movements intact.      Conjunctiva/sclera: Conjunctivae normal.      Pupils: Pupils are equal, round, and  reactive to light.   Cardiovascular:      Rate and Rhythm: Normal rate and regular rhythm.      Pulses: Normal pulses.      Heart sounds: Normal heart sounds.   Pulmonary:      Effort: Pulmonary effort is normal. No respiratory distress.      Breath sounds: Normal breath sounds. No wheezing, rhonchi or rales.   Chest:      Chest wall: No tenderness.   Abdominal:      General: Abdomen is flat.      Palpations: Abdomen is soft.   Musculoskeletal:         General: Normal range of motion.      Cervical back: Normal range of motion and neck supple. No rigidity or tenderness.   Lymphadenopathy:      Cervical: No cervical adenopathy.   Skin:     General: Skin is warm and dry.   Neurological:      General: No focal deficit present.      Mental Status: He is alert and oriented to person, place, and time. Mental status is at baseline.   Psychiatric:         Mood and Affect: Mood normal.         Behavior: Behavior normal.         Thought Content: Thought content normal.         Judgment: Judgment normal.             Results from POCT:   Results for orders placed or performed in visit on 11/03/23   POCT CoV-2, Flu A/B, RSV by PCR   Result Value Ref Range    SARS-CoV-2 by PCR Positive (A) Negative, Invalid    Influenza virus A RNA Negative Negative, Invalid    Influenza virus B, PCR Negative Negative, Invalid    RSV, PCR Negative Negative, Invalid   POCT CEPHEID GROUP A STREP - PCR   Result Value Ref Range    POC Group A Strep, PCR Not Detected Not Detected, Invalid       Assessment/Plan:     Diagnosis and associated orders:     1. COVID-19  - POCT CoV-2, Flu A/B, RSV by PCR    2. Acute pharyngitis, unspecified etiology  - dexamethasone (Decadron) injection (check route below) 10 mg  - POCT CEPHEID GROUP A STREP - PCR          Comments/MDM:     POCT COVID positive in clinic.  Negative strep.  I discussed self isolation and provided printed instructions. Educated patient to follow all CDC guidelines regarding infection  prevention.  She will need to quarantine for 5 days after onset of symptoms and wear tight fitting mask from day 6 through 10.  Per the [facility] trial I will administer 10 mg of dexamethasone in clinic for pharyngitis.  I discussed the risks vs benefits as well as alternatives with the patient and using shared decision making we have elected this as a treatment modality.       No evidence of lower respiratory involvement on exam today    Recommend symptomatic care:  OTC second generation antihistamine daily (cetirizine, desloratadine, fexofenadine, levocetirizine, and loratadine) daily IN COMBINATION WITH:  OTC decongestant (Sudafed - Pseudoephedrine) unless contraindication in place, such as hypertension, CAD, narrow-angle glaucoma. Use with caution if the patient has a history of cardiac dysrhythmias, hyperthyroidism, DM, prostatic hypertrophy, and glaucoma should use with caution.  Intranasal fluticasone (Flonase) daily  Nasal saline rinses 2-3 times a day   May use short term nasal sprays, such as oxymetazoline (Afrin) to help relieve nasal discomfort, congestion, and/or pressure. Decongestant sprays should not be used longer than three consecutive days.   Nasal rinsing with saline nasal spray or salt water (e.g., neti pot) can help relieve nasal dryness.  Breathe Right nasal strips at night for nasal congestion  Ponaris nasal emmollient for nasal congestion, dryness, and inflammation (do not use with iodine sensitivity)  Cool mist humidification, chest rubs, warm tea with honey, increased fluid intake to thin secretions  Tylenol or ibuprofen as needed for fever control, body aches, and headaches.    Sore throat care:  Warm salt water gargles, over-the-counter throat sprays, rest, hydration with frozen (eg, ice or popsicles) or warmed liquids, herbal tea containing licorice root, elm inner bark, marshmallow root, and licorice root aqueous dry extract, Cepacol lozenges, soft diet, honey, vitamin C, zinc lozenges,  and elderberry supplements.    Return to clinic or go to the ED if symptoms worsen or fail to improve, or if the patient should develop worsening/increasing sinus pain/pressure, congestion, ear pain, sore throat, headache, cough, shortness of breath, wheezing, chest pain, fever/chills, and/or any concerning symptoms. Patient is in agreement with treatment plan.            All questions answered. Patient verbalized understanding and is in agreement with this plan of care.     If symptoms are worsening or not improving in 3-5 days, follow-up with PCP or return to UC. Differential diagnosis, natural history, and supportive care discussed. AVS handout given and reviewed with patient. Patient educated on red flags and when to seek treatment back in ED or UC.     I personally reviewed prior external notes and test results pertinent to today's visit.  I have independently reviewed and interpreted all diagnostics ordered during this urgent care visit.     This dictation has been created using voice recognition software. The accuracy of the dictation is limited by the abilities of the software. I expect there may be some errors of grammar and possibly content. I made every attempt to manually correct the errors within my dictation. However, errors related to voice recognition software may still exist and should be interpreted within the appropriate context.    This note was electronically signed by CORAL Goyal

## 2025-01-06 ENCOUNTER — OFFICE VISIT (OUTPATIENT)
Dept: URGENT CARE | Facility: PHYSICIAN GROUP | Age: 35
End: 2025-01-06
Payer: COMMERCIAL

## 2025-01-06 VITALS
WEIGHT: 235.6 LBS | OXYGEN SATURATION: 98 % | TEMPERATURE: 97.5 F | RESPIRATION RATE: 16 BRPM | HEART RATE: 71 BPM | BODY MASS INDEX: 34.9 KG/M2 | HEIGHT: 69 IN | SYSTOLIC BLOOD PRESSURE: 128 MMHG | DIASTOLIC BLOOD PRESSURE: 76 MMHG

## 2025-01-06 DIAGNOSIS — R05.8 DRY COUGH: ICD-10-CM

## 2025-01-06 DIAGNOSIS — J34.89 FRONTAL SINUS PAIN: ICD-10-CM

## 2025-01-06 LAB
FLUAV RNA SPEC QL NAA+PROBE: NEGATIVE
FLUBV RNA SPEC QL NAA+PROBE: NEGATIVE
RSV RNA SPEC QL NAA+PROBE: NEGATIVE
S PYO DNA SPEC NAA+PROBE: NOT DETECTED
SARS-COV-2 RNA RESP QL NAA+PROBE: NEGATIVE

## 2025-01-06 PROCEDURE — 3078F DIAST BP <80 MM HG: CPT

## 2025-01-06 PROCEDURE — 99213 OFFICE O/P EST LOW 20 MIN: CPT

## 2025-01-06 PROCEDURE — 0241U POCT CEPHEID COV-2, FLU A/B, RSV - PCR: CPT

## 2025-01-06 PROCEDURE — 87651 STREP A DNA AMP PROBE: CPT

## 2025-01-06 PROCEDURE — 3074F SYST BP LT 130 MM HG: CPT

## 2025-01-06 ASSESSMENT — ENCOUNTER SYMPTOMS
SINUS PRESSURE: 1
PALPITATIONS: 0
LOSS OF CONSCIOUSNESS: 0
ABDOMINAL PAIN: 0
SORE THROAT: 1
SWOLLEN GLANDS: 0
VOMITING: 0
DIZZINESS: 0
CHILLS: 0
BLURRED VISION: 0
MYALGIAS: 0
FOCAL WEAKNESS: 0
HOARSE VOICE: 1
SHORTNESS OF BREATH: 0
FEVER: 0
WEAKNESS: 0
DIARRHEA: 0
COUGH: 1
HEADACHES: 0
NECK PAIN: 0
NAUSEA: 0
FALLS: 0
DIAPHORESIS: 0

## 2025-01-06 NOTE — PROGRESS NOTES
Subjective:     Saúl Ramsay is a 34 y.o. male who presents for Sinus Problem (Patient is having sinus pressure, and mild cough that began this morning. Patient states was exposed to FLU and Strep.)      Sinusitis  This is a new problem. The current episode started today. The problem has been gradually improving since onset. There has been no fever. His pain is at a severity of 2/10. The pain is mild. Associated symptoms include congestion, coughing, ear pain, a hoarse voice, sinus pressure and a sore throat. Pertinent negatives include no chills, diaphoresis, headaches, neck pain, shortness of breath, sneezing or swollen glands. Past treatments include nothing.       Review of Systems   Constitutional:  Negative for chills, diaphoresis, fever and malaise/fatigue.   HENT:  Positive for congestion, ear pain, hoarse voice, sinus pressure and sore throat. Negative for ear discharge and sneezing.    Eyes:  Negative for blurred vision.   Respiratory:  Positive for cough. Negative for shortness of breath.    Cardiovascular:  Negative for chest pain, palpitations and leg swelling.   Gastrointestinal:  Negative for abdominal pain, diarrhea, nausea and vomiting.   Musculoskeletal:  Negative for falls, myalgias and neck pain.   Skin:  Negative for itching and rash.   Neurological:  Negative for dizziness, focal weakness, loss of consciousness, weakness and headaches.        CURRENT MEDICATIONS:  cetirizine Tabs  fluticasone  IBU PO  ondansetron Tabs    Allergies:   No Known Allergies    Current Problems: Saúl Ramsay does not have any pertinent problems on file.  Past Surgical Hx:  No past surgical history on file.   Past Social Hx:  reports that he has never smoked. He has never used smokeless tobacco. He reports that he does not currently use alcohol. He reports that he does not currently use drugs.   Past Family Hx:  Saúl Ramsay family history includes Diabetes in his father, maternal aunt, maternal aunt, maternal  grandfather, maternal grandmother, maternal uncle, mother, paternal grandfather, and paternal grandmother.     (Allergies, Medications, & Tobacco/Substance Use were reconciled by the Medical Assistant and reviewed by myself. The family history is prepopulated)       Objective:     Wt 107 kg (235 lb 9.6 oz)   BMI 34.79 kg/m²     Physical Exam  Constitutional:       General: He is not in acute distress.     Appearance: Normal appearance. He is not ill-appearing or toxic-appearing.   HENT:      Head: Normocephalic and atraumatic.      Nose: Congestion present. No rhinorrhea.      Mouth/Throat:      Pharynx: No oropharyngeal exudate or posterior oropharyngeal erythema.   Eyes:      General: No scleral icterus.        Right eye: No discharge.         Left eye: No discharge.   Cardiovascular:      Rate and Rhythm: Normal rate and regular rhythm.      Heart sounds: Normal heart sounds. No murmur heard.     No friction rub. No gallop.   Pulmonary:      Effort: Pulmonary effort is normal. No respiratory distress.      Breath sounds: Normal breath sounds.   Abdominal:      General: Abdomen is flat.      Palpations: Abdomen is soft.   Musculoskeletal:         General: Normal range of motion.      Cervical back: Normal range of motion.   Neurological:      General: No focal deficit present.      Mental Status: He is alert and oriented to person, place, and time. Mental status is at baseline.   Psychiatric:         Behavior: Behavior normal.         Judgment: Judgment normal.         Assessment/Plan:     Saúl was seen today for sinus problem.    Diagnoses and all orders for this visit:    Frontal sinus pain  -     POCT CoV-2, Flu A/B, RSV by PCR  -     POCT GROUP A STREP, PCR    Dry cough     Based on history of presenting illness, review of systems and physical exam findings, most likely etiology of sinus pain is viral URI. Discussed symptomatic management with pharmacotherapy and over-the-counter medications.  On my exam I  do not see any signs or symptoms consistent with a bacterial infection currently requiring oral antibiotics.  Discussed the risks the benefits and the indications of all new medications prescribed today.  Strict return and ED precautions were discussed and all questions were answered.     Differential diagnosis, natural history, supportive care, and indications for immediate follow-up discussed.    Advised the patient to follow-up with the primary care physician for recheck, reevaluation, and consideration of further management.    Please note that this dictation was created using voice recognition software. I have made reasonable attempt to correct obvious errors, but I expect that there are errors of grammar and possibly content that I did not discover before finalizing the note.    This note was electronically signed by Bonnie Ashraf MD PhD